# Patient Record
Sex: MALE | Race: WHITE | URBAN - METROPOLITAN AREA
[De-identification: names, ages, dates, MRNs, and addresses within clinical notes are randomized per-mention and may not be internally consistent; named-entity substitution may affect disease eponyms.]

---

## 2019-07-15 ENCOUNTER — HOSPITAL ENCOUNTER (OUTPATIENT)
Dept: LAB | Facility: HOSPITAL | Age: 24
Discharge: HOME | End: 2019-07-15
Attending: INTERNAL MEDICINE

## 2019-07-15 LAB
T4 FREE SERPL-MCNC: 1.3 NG/DL (ref 0.9–1.8)
TSH SERPL-ACNC: 4.35 M[IU]/L (ref 0.27–4.2)

## 2019-07-16 ENCOUNTER — OFFICE VISIT CONVERTED (OUTPATIENT)
Dept: OTOLARYNGOLOGY | Facility: CLINIC | Age: 24
End: 2019-07-16
Attending: OTOLARYNGOLOGY

## 2021-05-15 VITALS
TEMPERATURE: 98.2 F | WEIGHT: 268.25 LBS | OXYGEN SATURATION: 97 % | RESPIRATION RATE: 20 BRPM | HEIGHT: 75 IN | BODY MASS INDEX: 33.35 KG/M2 | HEART RATE: 103 BPM | SYSTOLIC BLOOD PRESSURE: 112 MMHG | DIASTOLIC BLOOD PRESSURE: 64 MMHG

## 2024-09-04 ENCOUNTER — OFFICE VISIT (OUTPATIENT)
Dept: NEUROLOGY | Facility: CLINIC | Age: 29
End: 2024-09-04
Payer: MEDICARE

## 2024-09-04 VITALS
HEIGHT: 75 IN | SYSTOLIC BLOOD PRESSURE: 124 MMHG | HEART RATE: 80 BPM | BODY MASS INDEX: 37.85 KG/M2 | WEIGHT: 304.4 LBS | DIASTOLIC BLOOD PRESSURE: 82 MMHG

## 2024-09-04 DIAGNOSIS — Z87.898 HISTORY OF BRAIN TUMOR: ICD-10-CM

## 2024-09-04 DIAGNOSIS — G43.019 INTRACTABLE MIGRAINE WITHOUT AURA AND WITHOUT STATUS MIGRAINOSUS: Primary | ICD-10-CM

## 2024-09-04 DIAGNOSIS — R42 DIZZINESS: ICD-10-CM

## 2024-09-04 DIAGNOSIS — R26.89 IMBALANCE: ICD-10-CM

## 2024-09-04 RX ORDER — METOPROLOL SUCCINATE 50 MG/1
1 TABLET, EXTENDED RELEASE ORAL EVERY 12 HOURS SCHEDULED
COMMUNITY
Start: 2024-06-06

## 2024-09-04 RX ORDER — ARIPIPRAZOLE 400 MG
400 KIT INTRAMUSCULAR
COMMUNITY
Start: 2024-08-29

## 2024-09-04 RX ORDER — OLANZAPINE 10 MG/1
10 TABLET ORAL NIGHTLY
COMMUNITY
Start: 2024-09-03

## 2024-09-04 RX ORDER — OXCARBAZEPINE 300 MG/1
300 TABLET, FILM COATED ORAL 2 TIMES DAILY
COMMUNITY
Start: 2024-09-03

## 2024-09-04 RX ORDER — RIMEGEPANT SULFATE 75 MG/75MG
75 TABLET, ORALLY DISINTEGRATING ORAL DAILY PRN
Qty: 8 TABLET | Refills: 5 | Status: SHIPPED | OUTPATIENT
Start: 2024-09-04

## 2024-09-04 RX ORDER — PRAZOSIN HYDROCHLORIDE 2 MG/1
6 CAPSULE ORAL NIGHTLY
COMMUNITY
Start: 2024-09-03

## 2024-09-04 RX ORDER — GALCANEZUMAB 120 MG/ML
120 INJECTION, SOLUTION SUBCUTANEOUS
Qty: 1 EACH | Refills: 5 | Status: SHIPPED | OUTPATIENT
Start: 2024-09-04

## 2024-09-04 RX ORDER — BUPROPION HYDROCHLORIDE 300 MG/1
300 TABLET ORAL EVERY MORNING
COMMUNITY

## 2024-09-04 RX ORDER — ACETAMINOPHEN 500 MG
500 TABLET ORAL AS NEEDED
COMMUNITY

## 2024-09-04 RX ORDER — CETIRIZINE HYDROCHLORIDE 10 MG/1
10 TABLET ORAL DAILY
COMMUNITY

## 2024-09-04 RX ORDER — PHENOL 1.4 %
10 AEROSOL, SPRAY (ML) MUCOUS MEMBRANE NIGHTLY
COMMUNITY

## 2024-09-04 NOTE — PROGRESS NOTES
Chief Complaint  Neurologic Problem    Subjective          Bruce Mckeon presents to [facility] NEUROLOGY & NEUROSURGERY  History of Present Illness    History of Present Illness  The patient presents for evaluation of headaches. He is accompanied by his mother.    He has been experiencing worsening headaches since January 2024, with an increase in frequency to about 20 days per month. The intensity of these headaches varies, sometimes lasting for an hour, other times persisting throughout the day. He also reports sensitivity to sunlight, which exacerbates his headaches, necessitating the use of sensitive glasses. His current medications include amitriptyline, metoprolol, and oxcarbazepine, none of which were prescribed specifically for headache prevention. He often resorts to Tylenol, up to twice a day, to manage his headaches. He identifies stress, poor sleep, and weather changes as potential triggers for his headaches, which are primarily located behind his eyes and occasionally at the back of his head.    He also experiences frequent dizzy spells, causing him to stumble and lose balance. These episodes can occur even when he is sitting or walking across a room, forcing him to grab onto walls for support. He describes the sensation as similar to being hit with a baseball, but without the accompanying headache. Instead, he experiences dizziness and fuzzy vision.    His mother recalls that he was diagnosed with a lesion at the age of 10, which was expected to grow slowly and cause progressive symptoms. However, they were not informed about the specific symptoms to anticipate. She expresses concern that the lesion may have grown significantly, leading to his current symptoms. He has not undergone any surgical procedures to remove the lesion due to the associated risks, including potential paralysis and speech loss. Additionally, chemotherapy or radiation therapy to shrink the lesion were not  "considered due to his medication regimen.     Prior prophylactic migraine medication: metoprolol, amitrtiptyline, topiramate/depakote contraindicated d/t mood disorder   Prior abortive migraine medications: Triptans contraindicated d/t risk for serotonin syndrome    Objective   Vital Signs:   /82   Pulse 80   Ht 190.5 cm (75\")   Wt (!) 138 kg (304 lb 6.4 oz)   BMI 38.05 kg/m²     Physical Exam  HENT:      Head: Normocephalic.   Pulmonary:      Effort: Pulmonary effort is normal.   Neurological:      Mental Status: He is alert and oriented to person, place, and time.      Sensory: Sensation is intact.      Motor: Motor function is intact.      Coordination: Coordination is intact.      Deep Tendon Reflexes: Reflexes are normal and symmetric.        Neurologic Exam     Mental Status   Oriented to person, place, and time.        Result Review :             MRI Brain 1/2015:  Multiplanar imaging of the brain with and without contrast is compared to the prior study from December 2013.   There is redemonstration of a small focus of T2 prolongation in the right frontal white matter, just lateral to the right frontal horn. A second focus of T2 prolongation is visualized in the medial left parietal white matter. There is no associated mass   effect, restricted diffusion or enhancement. These are unchanged compared to the prior study and are most consistent with small areas of chronic white matter injury from a previous vascular, inflammatory or metabolic insult . The previously noted T2   prolongation in the left basal ganglia is now resolved. No new lesions are identified. The ventricles are normal in size. There is no cytotoxic edema on the diffusion sequence. The flow voids at the base of the skull appear normal. The corpus callosum is    normally formed. No abnormal enhancement is identified.     Assessment and Plan    Diagnoses and all orders for this visit:    1. Intractable migraine without aura and without " status migrainosus (Primary)  -     MRI Brain With & Without Contrast; Future  -     galcanezumab-gnlm (EMGALITY) auto-injector pen 240 mg    2. History of brain tumor  -     MRI Brain With & Without Contrast; Future    3. Dizziness  -     MRI Brain With & Without Contrast; Future    4. Imbalance  -     MRI Brain With & Without Contrast; Future    Other orders  -     galcanezumab-gnlm (Emgality) 120 MG/ML auto-injector pen; Inject 1 mL under the skin into the appropriate area as directed Every 30 (Thirty) Days.  Dispense: 1 each; Refill: 5  -     Rimegepant Sulfate (Nurtec) 75 MG tablet dispersible tablet; Take 1 tablet by mouth Daily As Needed (migraine).  Dispense: 8 tablet; Refill: 5        Assessment & Plan  1. Headaches.  He has been experiencing worsening headaches over the past year, now occurring about 20 days per month. The headaches are associated with light sensitivity, particularly to sunlight. The frequent use of Tylenol could be exacerbating his headaches. He was advised to limit Tylenol intake to no more than two doses per week. Emgality injections will be initiated monthly for headache prevention. Nurtec, one tablet per day as needed, will be provided for symptomatic relief. The first dose of the injectable will be administered in the office today with a sample.    2. Dizziness.  He reports frequent dizziness, sometimes severe enough to cause stumbling and needing to catch himself. The dizziness could be related to his current medications, which may cause imbalance and ataxia, or it could be associated with his persistent headaches. A brain MRI will be scheduled to obtain recent imaging and assess for any changes in the brain mass that might be contributing to these symptoms.    Follow-up  A follow-up visit is scheduled in 3 months to evaluate his progress and review the imaging results.         Follow Up   Return in about 3 months (around 12/4/2024) for Migraine f/u.  Patient was given instructions  and counseling regarding his condition or for health maintenance advice. Please see specific information pulled into the AVS if appropriate.       Patient or patient representative verbalized consent for the use of Ambient Listening during the visit with  JONATHAN Oconnor for chart documentation. 9/5/2024  11:43 EDT

## 2024-09-05 ENCOUNTER — PRIOR AUTHORIZATION (OUTPATIENT)
Dept: NEUROLOGY | Facility: CLINIC | Age: 29
End: 2024-09-05
Payer: MEDICARE

## 2024-09-05 NOTE — TELEPHONE ENCOUNTER
PA submitted through [facility]  Your request has been approved. Authorization Expiration Date: December 31, 2024.

## 2024-09-19 ENCOUNTER — HOSPITAL ENCOUNTER (OUTPATIENT)
Dept: MRI IMAGING | Facility: HOSPITAL | Age: 29
Discharge: HOME | End: 2024-09-19
Payer: MEDICARE

## 2024-09-19 ENCOUNTER — HOSPITAL ENCOUNTER (OUTPATIENT)
Dept: OTHER | Facility: HOSPITAL | Age: 29
Discharge: HOME | End: 2024-09-19
Payer: MEDICARE

## 2024-09-19 DIAGNOSIS — G43.019 INTRACTABLE MIGRAINE WITHOUT AURA AND WITHOUT STATUS MIGRAINOSUS: ICD-10-CM

## 2024-09-19 DIAGNOSIS — R26.89 IMBALANCE: ICD-10-CM

## 2024-09-19 DIAGNOSIS — Z87.898 HISTORY OF BRAIN TUMOR: ICD-10-CM

## 2024-09-19 DIAGNOSIS — R42 DIZZINESS: ICD-10-CM

## 2024-09-19 PROCEDURE — 70553 MRI BRAIN STEM W/O & W/DYE: CPT

## 2024-09-19 PROCEDURE — 0 GADOBENATE DIMEGLUMINE 529 MG/ML SOLUTION: Performed by: NURSE PRACTITIONER

## 2024-09-19 PROCEDURE — A9577 INJ MULTIHANCE: HCPCS | Performed by: NURSE PRACTITIONER

## 2024-09-19 RX ADMIN — GADOBENATE DIMEGLUMINE 20 ML: 529 INJECTION, SOLUTION INTRAVENOUS at 10:35

## 2024-12-04 ENCOUNTER — OFFICE VISIT (OUTPATIENT)
Dept: NEUROLOGY | Facility: CLINIC | Age: 29
End: 2024-12-04
Payer: MEDICARE

## 2024-12-04 ENCOUNTER — PATIENT MESSAGE (OUTPATIENT)
Dept: NEUROLOGY | Facility: CLINIC | Age: 29
End: 2024-12-04

## 2024-12-04 VITALS
WEIGHT: 315 LBS | BODY MASS INDEX: 39.17 KG/M2 | DIASTOLIC BLOOD PRESSURE: 78 MMHG | SYSTOLIC BLOOD PRESSURE: 124 MMHG | HEART RATE: 88 BPM | HEIGHT: 75 IN

## 2024-12-04 DIAGNOSIS — G43.719 INTRACTABLE CHRONIC MIGRAINE WITHOUT AURA AND WITHOUT STATUS MIGRAINOSUS: Primary | ICD-10-CM

## 2024-12-04 DIAGNOSIS — Q85.00 NEUROFIBROMATOSIS: ICD-10-CM

## 2024-12-04 RX ORDER — RIMEGEPANT SULFATE 75 MG/75MG
75 TABLET, ORALLY DISINTEGRATING ORAL DAILY PRN
Qty: 8 TABLET | Refills: 5 | Status: SHIPPED | OUTPATIENT
Start: 2024-12-04

## 2024-12-04 RX ORDER — ATOGEPANT 60 MG/1
60 TABLET ORAL DAILY
Qty: 30 TABLET | Refills: 5 | Status: SHIPPED | OUTPATIENT
Start: 2024-12-04

## 2024-12-04 NOTE — PROGRESS NOTES
"Chief Complaint  Migraine    Subjective          Bruce Mckeon presents to [facility] NEUROLOGY & NEUROSURGERY  History of Present Illness    History of Present Illness  The patient is a 29-year-old male who presents to the office for a follow-up for migraines. He is accompanied by an adult female.    He reports that his condition initially improved after starting the migraine medication. However, he began experiencing dizzy spells and headaches again. He estimates experiencing headaches on approximately 12 days per month. He notes that his dizziness seems to worsen when the effects of Emgality start to wear off. He finds that Nurtec, which he takes as needed, provides relief.    He also expresses concern about a polyp or similar growth in his sinus.    Additionally, he has a history of neurofibromatosis. A repeat MRI showed stable lesions with no significant changes.       Objective   Vital Signs:   /78   Pulse 88   Ht 190.5 cm (75\")   Wt (!) 144 kg (316 lb 9.6 oz)   BMI 39.57 kg/m²     Physical Exam  HENT:      Head: Normocephalic.   Pulmonary:      Effort: Pulmonary effort is normal.   Neurological:      Mental Status: He is alert and oriented to person, place, and time.      Sensory: Sensation is intact.      Motor: Motor function is intact.      Coordination: Coordination is intact.      Deep Tendon Reflexes: Reflexes are normal and symmetric.        Neurological Exam  Mental Status  Alert. Oriented to person, place, and time.    Sensory  Normal sensation.    Reflexes  Deep tendon reflexes are 2+ and symmetric in all four extremities.    Coordination    Finger-to-nose, rapid alternating movements and heel-to-shin normal bilaterally without dysmetria.      Result Review :               Results for orders placed or performed during the hospital encounter of 09/19/24   MRI Brain With & Without Contrast    Narrative    MRI BRAIN W WO CONTRAST    Date of Exam: 9/19/2024 10:05 AM " EDT    Indication: history of brain mass, headaches. History of neurofibromatosis     Comparison: 1/16/2015      Technique:  Routine multiplanar/multisequence sequence images of the brain were obtained before and after the uneventful administration of Multihance.      Findings:     There are small areas of abnormal increased T2 signal within the periventricular white matter of the right frontal lobe and left occipital lobe. These likely represent NF bodies given the patient's clinical history. No discrete mass lesion identified.   No midline shift or hydrocephalus. No abnormal extra-axial fluid collections.    Major intracranial vascular flow voids are preserved.    Sella and suprasellar cistern appear within normal limits. Craniovertebral junction appears normal.    Postcontrast images demonstrate no pathologic intracranial contrast enhancement. Dural venous sinuses are patent.    Globes and orbits are within normal limits. There is a polyp or mucous retention cyst in the left maxillary sinus. Other visualized paranasal sinuses and mastoid air cells are clear.         Impression    Impression:    1. Areas of abnormal increased T2 signal within the periventricular white matter of both cerebral hemispheres which may be related to NF bodies given the patient's history of neurofibromatosis.  2. No intracranial mass lesion identified.  3. No pathologic contrast enhancement or other acute abnormality.        Electronically Signed: Sloan Lozoya MD    9/19/2024 1:34 PM EDT    Workstation ID: [id number]       Assessment and Plan    Diagnoses and all orders for this visit:    1. Intractable chronic migraine without aura and without status migrainosus (Primary)    2. Neurofibromatosis    Other orders  -     Atogepant (Qulipta) 60 MG tablet; Take 1 tablet by mouth Daily.  Dispense: 30 tablet; Refill: 5  -     Rimegepant Sulfate (Nurtec) 75 MG tablet dispersible tablet; Take 1 tablet by mouth Daily As Needed (migraine).   Dispense: 8 tablet; Refill: 5        Assessment & Plan  1. Migraine.  His symptoms are primarily attributed to migraines. The MRI findings are consistent with neurofibromatosis, with no significant changes or areas of concern. A mucus retention cyst is present in the sinuses, which is not a neurological finding. The plan is to discontinue Emgality injections and initiate a daily regimen of Qulipta, a preventative headache medication. He is advised to continue taking Nurtec as needed. Once insurance approval is obtained, he can commence Qulipta, even if he is not receiving Emgality injections. The dosage is one tablet daily. Should he encounter any issues with Qulipta, he is to inform me immediately.    Follow-up  A follow-up appointment is scheduled in 4 months.         Follow Up   Return in about 4 months (around 4/4/2025) for Migraine f/u.  Patient was given instructions and counseling regarding his condition or for health maintenance advice. Please see specific information pulled into the AVS if appropriate.       Patient or patient representative verbalized consent for the use of Ambient Listening during the visit with  JONATHAN Oconnor for chart documentation. 12/9/2024  08:29 EST

## 2024-12-05 ENCOUNTER — PRIOR AUTHORIZATION (OUTPATIENT)
Dept: NEUROLOGY | Facility: CLINIC | Age: 29
End: 2024-12-05
Payer: MEDICARE

## 2024-12-05 NOTE — TELEPHONE ENCOUNTER
PA for Qulipta submitted through [facility]  This drug/product is not covered under the pharmacy benefit. Prior Authorization is not available

## 2024-12-05 NOTE — TELEPHONE ENCOUNTER
PA for Nurtec submitted through [facility]  This drug/product is not covered under the pharmacy benefit. Prior Authorization is not available

## 2024-12-09 NOTE — TELEPHONE ENCOUNTER
Resubmitted through [employer]  The patient currently has access to the requested medication and a Prior Authorization is not needed for the patient/medication.

## 2024-12-09 NOTE — TELEPHONE ENCOUNTER
Resubmitted through [employer]  Approved today by [employer] N[employer] 2017  Your request has been approved  Authorization Expiration Date: 3/9/2025

## 2025-01-29 RX ORDER — RIMEGEPANT SULFATE 75 MG/75MG
TABLET, ORALLY DISINTEGRATING ORAL
Qty: 8 TABLET | Refills: 5 | OUTPATIENT
Start: 2025-01-29

## 2025-03-10 ENCOUNTER — PRIOR AUTHORIZATION (OUTPATIENT)
Dept: NEUROLOGY | Facility: CLINIC | Age: 30
End: 2025-03-10
Payer: MEDICARE

## 2025-03-10 NOTE — TELEPHONE ENCOUNTER
Your request has been approved  Effective Date: 1/1/2025  Authorization Expiration Date: 12/31/2025

## 2025-04-07 ENCOUNTER — TELEPHONE (OUTPATIENT)
Dept: NEUROLOGY | Facility: CLINIC | Age: 30
End: 2025-04-07
Payer: MEDICARE

## 2025-04-07 NOTE — TELEPHONE ENCOUNTER
Caller: DIMPLE EISENBERG    Relationship to patient: Emergency Contact    Best call back number: [phone]    Chief complaint: BRAIN TUMORS    Type of visit: FOLLOW UP     Requested date: ASAP    If rescheduling, when is the original appointment: 4/9/25    Additional notes: THEY CAN'T COME THIS WEEK BECAUSE THE ROADS ARE CLOSED WITH FLOODING BUT CANNOT WAIT UNTIL JULY FOR NEXT AVAILABLE. PLEASE HELP RESCHEDULE SOONER.    ”

## 2025-04-30 RX ORDER — ATOGEPANT 60 MG/1
60 TABLET ORAL DAILY
Qty: 30 TABLET | Refills: 0 | Status: SHIPPED | OUTPATIENT
Start: 2025-04-30

## 2025-06-13 NOTE — PROGRESS NOTES
Patient Name: Bruce Mckeon   Visit Date: 06/17/2025   Patient ID: [id number]  Provider: JONATHAN Anderson    Sex: male  Location: [facility]   YOB: 1995  Location Address: [address] [place] [address], [address] [place] [address],?[place]?[id number]    Primary Care Provider Chelsy Tobar APRN  Location Phone: [phone]    Referring Provider: No ref. provider found        Chief Complaint  Nose Bleed (Frequent nose bleeds, last time had to have this cauterized  ), Establish Care, and Dizziness (Having freuqent dizzy spells that throws patients balance off)    History of Present Illness  Bruce Mckeon is a 30 y.o. male who presents to [facility] for Nose Bleed (Frequent nose bleeds, last time had to have this cauterized  ), Establish Care, and Dizziness (Having freuqent dizzy spells that throws patients balance off)  Patient referred to ENT on 2/21/2025 by JONATHAN Vela for epistaxis evaluation.  History of allergies on Zyrtec.  Previous cauterization for epistaxis in the past.  MRI with/ without contrast 9/4/24: Impression:  1. Areas of abnormal increased T2 signal within the periventricular white matter of both cerebral hemispheres which may be related to NF bodies given the patient's history of neurofibromatosis.  2. No intracranial mass lesion identified.  3. No pathologic contrast enhancement or other acute abnormality.  -There is a polyp or mucous retention cyst in the left maxillary sinus. Other visualized paranasal sinuses and mastoid air cells are clear.   -Personal examination of the MRI showed bilateral inferior turbinate hypertrophy with bilateral middle turbinate jesse bullosa.  Septum is midline.  No evidence of acoustic neuroma on evaluation of IACs.  History of Present Illness  The patient is a 30-year-old male who presents for evaluation of nosebleeds, dizziness, and sinus congestion.  He is accompanied by his mother.    He has been  experiencing nosebleeds or significant over the winter months. The nosebleeds occur on either nostril side, regardless of his position, and can last from 4 to 10 minutes. He manages them by elevating his head, inserting a tissue into his nose, and closing his eyes. Occasionally, he uses ice packs. He reports that his bleeding time was longer when he was younger. He has a history of nosebleeds, which were previously managed with cauterization. Over the past year, he has experienced nosebleeds at least once a week, with a noted increase during the winter months. The frequency of these episodes has recently decreased, with the last episode occurring approximately a month ago.    He experiences dizziness when stretching his body, describing it as a sensation of pressure on the brain, akin to being on a boat or having the room spin around him. He also reports lightheadedness. He does not experience hearing loss, ear fullness, drainage, or pain. 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 Initially, the dizziness was attributed to these conditions. However, the onset of nosebleeds led to further investigation. He is under the care of a neurologist who initially suspected a link between his migraines, dizziness, and the tumors.    He has a history of sinus issues, which have persisted since colin COVID-19 twice. He reports chronic congestion and difficulty breathing but does not use any nasal sprays. He has a history of ear infections, which were managed with ear tubes, adenoidectomy, and tonsillectomy.     He has schizophrenia and experiences auditory and visual hallucinations.    PAST SURGICAL HISTORY:  Ear tubes, adenoidectomy, and tonsillectomy at age 4.    Audiogram from 6/17/2025: SRT 15 on the right and 10 on the left.  Word discrimination scores 100% bilaterally.  Type a tympanograms bilaterally.  Pure-tone show right mild high-frequency SNHL at  "4000 Hz.  Left mild SNHL at 4008 1000 Hz.    Vital Signs:  Vitals:    06/17/25 1004   BP: 114/78   BP Location: Right arm   Patient Position: Sitting   Cuff Size: Large Adult   Pulse: 87   Temp: 98.2 °F (36.8 °C)   TempSrc: Temporal   SpO2: 97%   Weight: (!) 143 kg (315 lb)   Height: 190.5 cm (75\")        Past Medical History:   Diagnosis Date    Asthma     Brain lesion     Cluster headache     Dental disease     Developmental delay     Dizziness     Memory loss     Migraine     Neurofibromatosis     type 1    Nosebleed     Schizophrenia     Seizures     detected on EEG       Past Surgical History:   Procedure Laterality Date    ADENOIDECTOMY      EAR TUBES      x2    KNEE ARTHROSCOPY W/ ACL RECONSTRUCTION Right     TONSILLECTOMY AND ADENOIDECTOMY           Current Outpatient Medications:     Abilify Maintena 400 MG prefilled syringe IM prefilled syringe, Inject 400 mg into the appropriate muscle as directed by prescriber Every 30 (Thirty) Days. Invega injection per pt, Disp: , Rfl:     acetaminophen (TYLENOL) 500 MG tablet, Take 1 tablet by mouth As Needed for Mild Pain., Disp: , Rfl:     amitriptyline (ELAVIL) 25 MG tablet, Take 1 tablet by mouth Every Night., Disp: , Rfl:     Atogepant (Qulipta) 60 MG tablet, TAKE 1 TABLET BY MOUTH DAILY., Disp: 30 tablet, Rfl: 0    buPROPion XL (WELLBUTRIN XL) 300 MG 24 hr tablet, Take 1 tablet by mouth Every Morning., Disp: , Rfl:     cetirizine (zyrTEC) 10 MG tablet, Take 1 tablet by mouth Daily., Disp: , Rfl:     Melatonin 10 MG tablet, Take 1 tablet by mouth Every Night., Disp: , Rfl:     metoprolol succinate XL (TOPROL-XL) 50 MG 24 hr tablet, Take 1 tablet by mouth Every 12 (Twelve) Hours. (Patient taking differently: Take 1 tablet by mouth Daily.), Disp: , Rfl:     OLANZapine (zyPREXA) 10 MG tablet, Take 1 tablet by mouth Every Night., Disp: , Rfl:     OXcarbazepine (TRILEPTAL) 300 MG tablet, Take 1 tablet by mouth 2 (Two) Times a Day., Disp: , Rfl:     Paliperidone " Palmitate (INVEGA SUSTENNA IM), Inject  into the appropriate muscle as directed by prescriber., Disp: , Rfl:     prazosin (MINIPRESS) 2 MG capsule, Take 3 capsules by mouth Every Night., Disp: , Rfl:     Rimegepant Sulfate (Nurtec) 75 MG tablet dispersible tablet, Take 1 tablet by mouth Daily As Needed (migraine)., Disp: 8 tablet, Rfl: 5    fluticasone (FLONASE) 50 MCG/ACT nasal spray, Administer 2 sprays into the nostril(s) as directed by provider Daily. Administer 2 sprays in each nostril for each dose., Disp: 16 g, Rfl: 6    galcanezumab-gnlm (Emgality) 120 MG/ML auto-injector pen, Inject 1 mL under the skin into the appropriate area as directed Every 30 (Thirty) Days., Disp: 1 each, Rfl: 5     No Known Allergies    Social History     Tobacco Use    Smoking status: Never    Smokeless tobacco: Never   Vaping Use    Vaping status: Never Used   Substance Use Topics    Alcohol use: Never     Comment: occ    Drug use: Never        Objective     Tobacco Use: Low Risk  (6/17/2025)    Patient History     Smoking Tobacco Use: Never     Smokeless Tobacco Use: Never     Passive Exposure: Not on file         Physical Exam    Constitutional   Appearance  well developed, well-nourished, alert and in no acute distress, voice clear and strong    Head   Inspection  no deformities or lesions, atraumatic    Face   Inspection  no facial lesions; House-Brackmann I/VI bilaterally   Palpation  no TMJ crepitus nor  muscle tenderness bilaterally     Eyes/Vision   Visual Fields  extraocular movements are intact, no spontaneous or gaze-induced nystagmus  Conjunctivae  clear   Sclerae  clear   Pupils and Irises  pupils equal, round, and reactive to light.   Nystagmus  not present with Dell-Hallpike or head impulse test    Ears, Nose, Mouth and Throat  Ears  External Ears  Auricles appearance within normal limits, no lesions present   Otoscopic Examination  tympanic membrane appearance within normal limits bilaterally without  perforations, well-aerated middle ears without evidence of effusion  Hearing  intact to conversational voice both ears   Tunning fork testing    Rinne:  Galindo:    Nose  External Nose  appearance normal   Intranasal Exam  mucosa diffuse erythema with bilateral inferior turbinate hypertrophy, vestibules normal, no intranasal lesions present, septum midline with no vascular lesions, sinuses non tender to percussion   Modified Walton Test:    Oral Cavity  Oral Mucosa  oral mucosa normal without pallor or cyanosis   Stensen's and Warthin's ducts are productive and patent with clear saliva  Lips  lip appearance normal   Teeth  normal dentition for age   Gums  gums pink, non-swollen, no bleeding present   Tongue  tongue appearance normal; normal mobility   Palate  hard palate normal, soft palate appearance normal with symmetric mobility     Throat  Oropharynx  Posterior cobblestoning with clear PND   tonsils  Bilateral tonsils unremarkable  Hypopharynx  appearance within normal limits   Larynx  voice normal     Neck  Inspection/Palpation  normal appearance, no masses or tenderness, trachea midline; thyroid size normal, nontender, no nodules or masses present on palpation     Lymphatic  Neck  no lymphadenopathy present   Supraclavicular Nodes  no lymphadenopathy present   Preauricular Nodes  no lymphadenopathy present     Respiratory  Respiratory Effort  breathing unlabored   Inspection of Chest  normal appearance, no retractions     Musculoskeletal   Cervical back: Normal range of motion and neck supple.      Skin and Subcutaneous Tissue  General Inspection  Regarding face and neck - there are no rashes present, no lesions present, and no areas of discoloration     Neurologic  Cranial Nerves  Alert and oriented x3  cranial nerves II-XII are grossly intact bilaterally   Gait and Station  normal gait, able to stand without diffculty    Psychiatric  Judgement and Insight  judgment and insight intact   Mood and Affect  mood  "normal, affect appropriate       RESULTS REVIEWED    I have reviewed the following information:   []  Previous Internal Note  [x]  Previous External Note:   [x]  Ordered Tests & Results:      Pathology: No results found for: \"MICRO\"    TSH   Date Value Ref Range Status   07/15/2019 4.350 (H) 0.270 - 4.200 m[iU]/L Final     Free T4   Date Value Ref Range Status   07/15/2019 1.3 0.9 - 1.8 ng/dL Final       No Images in the past 120 days found..    Results  Imaging   - MRI: Right maxillary sinus completely open, left maxillary sinus looks good except for a small polyp, likely a mucous retention cyst. Septum is straight. Turbinates are mildly enlarged creating a little bit of lack of airflow. Small air pockets in middle turbinates on both sides, called jesse bullosa. Frontal sinuses look good, sphenoid sinuses back behind eyes are clear. No signs of infection or blockage in ethmoid and sphenoid sinuses.    Diagnostic Testing   - Tympanogram: No fluid behind ears.      I have discussed the interpretation of the above results with the patient.    Procedures          Assessment and Plan   Diagnoses and all orders for this visit:    1. Recurrent epistaxis (Primary)  -     Von Willebrand Panel; Future  -     aPTT; Future  -     Protime-INR; Future  -     CBC Auto Differential; Future    2. Seasonal allergic rhinitis due to pollen  -     fluticasone (FLONASE) 50 MCG/ACT nasal spray; Administer 2 sprays into the nostril(s) as directed by provider Daily. Administer 2 sprays in each nostril for each dose.  Dispense: 16 g; Refill: 6    3. Family history of von Willebrand disease  -     Von Willebrand Panel; Future  -     aPTT; Future  -     Protime-INR; Future  -     CBC Auto Differential; Future    4. Dizziness  -     Audiometry With Tympanometry  -     Videonystagmography; Future    5. Nasal turbinate hypertrophy  -     fluticasone (FLONASE) 50 MCG/ACT nasal spray; Administer 2 sprays into the nostril(s) as directed by " provider Daily. Administer 2 sprays in each nostril for each dose.  Dispense: 16 g; Refill: 6    6. Intractable chronic migraine without aura and without status migrainosus  -     Videonystagmography; Future    7. History of neurofibromatosis        Assessment & Plan  1. Epistaxis.  The epistaxis is likely due to allergic inflammation and seasonal dryness, with no identifiable cause on the septum that could be cauterized. The frequency of nosebleeds appears to be seasonal, potentially exacerbated by dry winter conditions. He was advised against tilting his head back during episodes of epistaxis to prevent blood from draining down the throat and into the stomach. Instead, he should lean forward, apply pressure to both sides of the septum for 5 minutes, and use over-the-counter Afrin or Delbert-Synephrine spray if bleeding persists. A humidifier and nasal saline spray were recommended for use during the winter months. Laboratory tests will be ordered to rule out von Willebrand's disease. If the frequency of nosebleeds increases, follow-up appointment will be scheduled to assess the need for cauterization.    2. Dizziness.  The MRI did not reveal any concerning vestibular findings. Mild sensorineural hearing loss was noted in both ears, but there is no evidence of fluid behind the ears based on the tympanogram. Hearing protection was recommended when exposed to loud noises from motorized equipment, gunfire, or fireworks. a VNG will be ordered to rule out any vestibular issues. The hearing test will be repeated in 06/2026.    3. Sinus congestion.  The sinus congestion is likely contributing to his symptoms. Flonase nasal spray was prescribed, with instructions to administer 2 sprays in each nostril daily, and to perform a daily nasal saline rinse. If there is no improvement after 2 months of using Flonase and the nasal saline rinse, a CT scan will be considered to evaluate the need for surgical  intervention.    Follow-up  Follow-up in 2 months.      (R04.0) Recurrent epistaxis - Plan: Von Willebrand Panel, aPTT, Protime-INR, CBC Auto Differential    (J30.1) Seasonal allergic rhinitis due to pollen - Plan: fluticasone (FLONASE) 50 MCG/ACT nasal spray    (Z83.2) Family history of von Willebrand disease - Plan: Von Willebrand Panel, aPTT, Protime-INR, CBC Auto Differential    (R42) Dizziness - Plan: Audiometry With Tympanometry, Videonystagmography    (J34.3) Nasal turbinate hypertrophy - Plan: fluticasone (FLONASE) 50 MCG/ACT nasal spray    (G43.719) Intractable chronic migraine without aura and without status migrainosus - Plan: Videonystagmography    (Q85.00) History of neurofibromatosis     Bruce Mckeon  reports that he has never smoked. He has never used smokeless tobacco.       Plan:  Patient Instructions   Nosebleed Fact Sheet    Nosebleeds are a common problem that we see in our clinic and can occur in any age group.  They can range from spotty bleeding to episodes of uncontrolled profuse bleeding.  Multiple medical conditions can contribute to nosebleeds and fortunately most of these can be controlled to limit and/or eliminate these bleeding episodes.    Most commonly bleeding occurs in the anterior or front part of the nose on the septum, or center wall of the nose.  This is because this area has several blood vessels vulnerable to both the drying effect of breathing, and to finger trauma of nose picking.  When this area become dry, the lining of the nose in this area can crack and cause the blood vessels underneath to bleed.    The following condition can contribute to and cause nosebleeds:  High Blood Pressure - When the blood pressure is high, the increased pressure can cause blood to be more easily pushed through a damaged blood vessel.  If your blood pressure is uncontrolled over 140 systolic, you may need to consult your primary-care physician to help limit your nosebleeds.  Low Platelets  and Blood Clotting Factors - Certain medical conditions such as cancer and bleeding disorders can interfere with the body’s ability to form blood clots.  This interferes with the body’s own ability to stop nosebleeds.  Medications - Aspirin and aspirin type products such as nonsteroidal anti-inflammatory medications can interfere with body’s ability to form blood clots.  Nonsteroidal anti-inflammatory medications include medicines like ibuprofen (Motrin), naprosyn (Aleve), and Goody’s and BC powder.  Several cold and flu remedies also include aspirin.  If there’s a question, please ask you doctor or pharmacist.  Recently, it has been shown that some herbal medications, such as high doses of Vitamin E, can also interfere with the body’s ability to form clots.  Often times, these types of medications need to be discontinued to help with nosebleed prevention.  Dryness - The nose needs to stay nice and moist to try to prevent any kind of cracking or injury to the nasal lining and underlying blood vessels.  The worst time of year for nosebleeds is in the wintertime when the humidity is low.  Occasionally, a nasal deviation can cause abnormal airflow that dries out an area on the septum and cause a nosebleed.  Trauma -One of the most common reasons for nosebleeds is trauma caused by nose picking or external injury.  If an area in your nose is irritated, scratching or picking it can cause it to easily bleed.  Recommendations for Preventing Nosebleeds:  Keep well hydrated by drinking at least six to eight glasses of water a day.  Increase the moisture of the nose by placing Vaseline in the front of the nose twice a day and irrigating the nose with nasal saline spray often (every 1-2 hrs).  Hold on taking aspirin or aspirin type products.  If you have high blood pressure, make sure this is well controlled.  Avoid nose picking and other forms of nasal trauma.  What to Do if Your Nose Bleeds:  Spray Afrin or a similar 12 hr  nasal decongestant into the side that is bleeding.  With your thumb and forefinger, grasp the fleshy part of the nose and hold firm pressure.  If the bleeding is on the front part of the nose, it should make it stop.  Hold this pressure for 5 minutes on the clock then release.  If the nose is still bleeding, repeat.  If the nose is still bleeding after trying this 2-3 times, you may need to go to the emergency room for evaluation.  Call your doctor or go to the emergency room if you cannot get the bleeding to stop or if you feel dizzy or lightheaded after a large bleed.      Nosebleed, Adult  A nosebleed is when blood comes out of the nose. Nosebleeds are common. Usually, they are not a sign of a serious condition.  Nosebleeds can happen if a blood vessel in your nose starts to bleed or if the lining of your nose (mucous membrane) cracks. They are commonly caused by:  Allergies.  Colds.  Picking your nose.  Blowing your nose too hard.  An injury from sticking an object into your nose or getting hit in the nose.  Dry or cold air.  Less common causes of nosebleeds include:  Toxic fumes.  Something abnormal in the nose or in the air-filled spaces in the bones of the face (sinuses).  Growths in the nose, such as polyps.  Blood thinners or conditions that cause blood to clot slowly.  Certain illnesses or procedures that irritate or dry out the nasal passages.  Follow these instructions at home:  When you have a nosebleed:    Sit down and tilt your head slightly forward.  Use a clean towel or tissue to pinch your nostrils under the bony part of your nose. After 5 minutes, let go of your nose and see if bleeding starts again. Do not release pressure before that time. If there is still bleeding, repeat the pinching and holding for 5 minutes or until the bleeding stops.  Do not place tissues or gauze in the nose to stop the bleeding.  Avoid lying down and avoid tilting your head backward. That may make blood collect in the  throat and cause gagging or coughing.  Use a nasal spray decongestant to help with a nosebleed as told by your health care provider.  After a nosebleed:  Avoid blowing your nose or sniffing for a number of hours.  Avoid straining, lifting, or bending at the waist for several days. You may go back to other normal activities as you are able.  If you are taking aspirin or blood thinners and you have nosebleeds, talk to your health care provider. These medicines make bleeding more likely.  Ask your health care provider if you should stop taking the medicines or if you should adjust the dose.  Do not stop taking medicines that your health care provider has recommended unless he or she tells you to stop taking them.  If your nosebleed was caused by dry mucous membranes, use over-the-counter saline nasal spray or gel and a humidifier as told by your health care provider. This will keep the mucous membranes moist and allow them to heal. If you need to use one of these products:  Choose one that is water-soluble.  Use only as much as you need and use it only as often as needed.  Do not lie down right after you use it.  If you get nosebleeds often, talk with your health care provider about medical treatments. Options may include:  Nasal cautery. This treatment stops and prevents nosebleeds by using a chemical swab or electrical device to lightly burn tiny blood vessels inside the nose.  Nasal packing. A gauze or other material is placed in the nose to keep constant pressure on the bleeding area.  Contact a health care provider if you:  Have a fever.  Get nosebleeds often or more often than usual.  Bruise very easily.  Have a nosebleed from having something stuck in your nose.  Have bleeding in your mouth.  Vomit or cough up brown material.  Have a nosebleed after you start a new medicine.  Get help right away if:  You have a nosebleed after a fall or a head injury.  Your nosebleed does not go away after 20 minutes.  You feel  dizzy or weak.  You have unusual bleeding from other parts of your body.  You have unusual bruising on other parts of your body.  You become sweaty.  You vomit blood.  Summary  A nosebleed is when blood comes out of the nose. Common causes include allergies, an injury to the nose, or cold or dry air.  Initial treatment includes applying pressure for 5 minutes.  Moisturizing the nose with saline nasal spray or gel after a nosebleed may help prevent future bleeding.  Get help right away if your nosebleed does not go away after 20 minutes.  This information is not intended to replace advice given to you by your health care provider. Make sure you discuss any questions you have with your health care provider.    Proper Instillation of Intranasal sprays:  Blow nose to clear nostrils.  Place the tip of the nozzle in one nostril and close the other nostril with your finger.  Aim slightly away from the center of your nose, toward the corner of the eye, press the white nozzle, and sniff the mist in gently. Be careful not to spray into your eyes.  Exhale through the mouth.  Repeat process in other nostril.   -The recommended starting dose for Flonase in adults is 2 sprays in each nostril once a day.  -Your provider may have recommended use of nasal saline or nasal navage use to rinse your sinuses. If this is the case, it would be recommended use use saline prior to instillation of intranasal medications to rinse the sinus surface prior to the medication.  -Avoid use of nasal saline or nasal rinses within 30 minutes after intranasal medication use, as to allow the medication to fully absorb.       Follow Up   Return in about 2 months (around 8/17/2025).  Patient was given instructions and counseling regarding his condition or for health maintenance advice. Please see specific information pulled into the AVS if appropriate.      Patient or patient representative verbalized consent for the use of Ambient Listening during the  visit with  JONATHAN Anderson for chart documentation. 6/17/2025  12:03 EDT    All or a portion of this Note was dictated utilizing Dragon Dictation.

## 2025-06-17 ENCOUNTER — OFFICE VISIT (OUTPATIENT)
Dept: OTOLARYNGOLOGY | Facility: CLINIC | Age: 30
End: 2025-06-17
Payer: MEDICARE

## 2025-06-17 ENCOUNTER — PROCEDURE VISIT (OUTPATIENT)
Dept: OTOLARYNGOLOGY | Facility: CLINIC | Age: 30
End: 2025-06-17
Payer: MEDICARE

## 2025-06-17 ENCOUNTER — LAB (OUTPATIENT)
Dept: LAB | Facility: HOSPITAL | Age: 30
End: 2025-06-17
Payer: MEDICARE

## 2025-06-17 VITALS
HEIGHT: 75 IN | HEART RATE: 87 BPM | DIASTOLIC BLOOD PRESSURE: 78 MMHG | WEIGHT: 315 LBS | BODY MASS INDEX: 39.17 KG/M2 | TEMPERATURE: 98.2 F | OXYGEN SATURATION: 97 % | SYSTOLIC BLOOD PRESSURE: 114 MMHG

## 2025-06-17 DIAGNOSIS — Z83.2 FAMILY HISTORY OF VON WILLEBRAND DISEASE: ICD-10-CM

## 2025-06-17 DIAGNOSIS — R42 DIZZINESS: ICD-10-CM

## 2025-06-17 DIAGNOSIS — Z83.2 FAMILY HISTORY OF VON WILLEBRAND DISEASE: Primary | ICD-10-CM

## 2025-06-17 DIAGNOSIS — H90.3 SENSORINEURAL HEARING LOSS, BILATERAL: ICD-10-CM

## 2025-06-17 DIAGNOSIS — J30.1 SEASONAL ALLERGIC RHINITIS DUE TO POLLEN: ICD-10-CM

## 2025-06-17 DIAGNOSIS — Q85.00: ICD-10-CM

## 2025-06-17 DIAGNOSIS — G43.719 INTRACTABLE CHRONIC MIGRAINE WITHOUT AURA AND WITHOUT STATUS MIGRAINOSUS: ICD-10-CM

## 2025-06-17 DIAGNOSIS — R04.0 RECURRENT EPISTAXIS: Primary | ICD-10-CM

## 2025-06-17 DIAGNOSIS — R04.0 RECURRENT EPISTAXIS: ICD-10-CM

## 2025-06-17 DIAGNOSIS — J34.3 NASAL TURBINATE HYPERTROPHY: ICD-10-CM

## 2025-06-17 LAB
APTT PPP: 32.9 SECONDS (ref 24.2–34.2)
BASOPHILS # BLD AUTO: 0.05 10*3/MM3 (ref 0–0.2)
BASOPHILS NFR BLD AUTO: 0.5 % (ref 0–1.5)
DEPRECATED RDW RBC AUTO: 42.8 FL (ref 37–54)
EOSINOPHIL # BLD AUTO: 0.12 10*3/MM3 (ref 0–0.4)
EOSINOPHIL NFR BLD AUTO: 1.3 % (ref 0.3–6.2)
ERYTHROCYTE [DISTWIDTH] IN BLOOD BY AUTOMATED COUNT: 12.9 % (ref 12.3–15.4)
HCT VFR BLD AUTO: 51.9 % (ref 37.5–51)
HGB BLD-MCNC: 16.5 G/DL (ref 13–17.7)
IMM GRANULOCYTES # BLD AUTO: 0.04 10*3/MM3 (ref 0–0.05)
IMM GRANULOCYTES NFR BLD AUTO: 0.4 % (ref 0–0.5)
INR PPP: 1.03 (ref 0.86–1.15)
LYMPHOCYTES # BLD AUTO: 2.46 10*3/MM3 (ref 0.7–3.1)
LYMPHOCYTES NFR BLD AUTO: 25.7 % (ref 19.6–45.3)
MCH RBC QN AUTO: 28.8 PG (ref 26.6–33)
MCHC RBC AUTO-ENTMCNC: 31.8 G/DL (ref 31.5–35.7)
MCV RBC AUTO: 90.6 FL (ref 79–97)
MONOCYTES # BLD AUTO: 0.79 10*3/MM3 (ref 0.1–0.9)
MONOCYTES NFR BLD AUTO: 8.2 % (ref 5–12)
NEUTROPHILS NFR BLD AUTO: 6.13 10*3/MM3 (ref 1.7–7)
NEUTROPHILS NFR BLD AUTO: 63.9 % (ref 42.7–76)
NRBC BLD AUTO-RTO: 0 /100 WBC (ref 0–0.2)
PLATELET # BLD AUTO: 195 10*3/MM3 (ref 140–450)
PMV BLD AUTO: 12 FL (ref 6–12)
PROTHROMBIN TIME: 14 SECONDS (ref 11.8–14.9)
RBC # BLD AUTO: 5.73 10*6/MM3 (ref 4.14–5.8)
WBC NRBC COR # BLD AUTO: 9.59 10*3/MM3 (ref 3.4–10.8)

## 2025-06-17 PROCEDURE — 85246 CLOT FACTOR VIII VW ANTIGEN: CPT

## 2025-06-17 PROCEDURE — 92557 COMPREHENSIVE HEARING TEST: CPT | Performed by: AUDIOLOGIST

## 2025-06-17 PROCEDURE — 92567 TYMPANOMETRY: CPT | Performed by: AUDIOLOGIST

## 2025-06-17 PROCEDURE — 1159F MED LIST DOCD IN RCRD: CPT

## 2025-06-17 PROCEDURE — 85610 PROTHROMBIN TIME: CPT

## 2025-06-17 PROCEDURE — 85245 CLOT FACTOR VIII VW RISTOCTN: CPT

## 2025-06-17 PROCEDURE — 85240 CLOT FACTOR VIII AHG 1 STAGE: CPT

## 2025-06-17 PROCEDURE — 36415 COLL VENOUS BLD VENIPUNCTURE: CPT

## 2025-06-17 PROCEDURE — 85730 THROMBOPLASTIN TIME PARTIAL: CPT

## 2025-06-17 PROCEDURE — 1160F RVW MEDS BY RX/DR IN RCRD: CPT

## 2025-06-17 PROCEDURE — 99214 OFFICE O/P EST MOD 30 MIN: CPT

## 2025-06-17 PROCEDURE — 85025 COMPLETE CBC W/AUTO DIFF WBC: CPT

## 2025-06-17 RX ORDER — FLUTICASONE PROPIONATE 50 MCG
2 SPRAY, SUSPENSION (ML) NASAL DAILY
Qty: 16 G | Refills: 6 | Status: SHIPPED | OUTPATIENT
Start: 2025-06-17

## 2025-06-17 NOTE — PATIENT INSTRUCTIONS
Nosebleed Fact Sheet    Nosebleeds are a common problem that we see in our clinic and can occur in any age group.  They can range from spotty bleeding to episodes of uncontrolled profuse bleeding.  Multiple medical conditions can contribute to nosebleeds and fortunately most of these can be controlled to limit and/or eliminate these bleeding episodes.    Most commonly bleeding occurs in the anterior or front part of the nose on the septum, or center wall of the nose.  This is because this area has several blood vessels vulnerable to both the drying effect of breathing, and to finger trauma of nose picking.  When this area become dry, the lining of the nose in this area can crack and cause the blood vessels underneath to bleed.    The following condition can contribute to and cause nosebleeds:  High Blood Pressure - When the blood pressure is high, the increased pressure can cause blood to be more easily pushed through a damaged blood vessel.  If your blood pressure is uncontrolled over 140 systolic, you may need to consult your primary-care physician to help limit your nosebleeds.  Low Platelets and Blood Clotting Factors - Certain medical conditions such as cancer and bleeding disorders can interfere with the body’s ability to form blood clots.  This interferes with the body’s own ability to stop nosebleeds.  Medications - Aspirin and aspirin type products such as nonsteroidal anti-inflammatory medications can interfere with body’s ability to form blood clots.  Nonsteroidal anti-inflammatory medications include medicines like ibuprofen (Motrin), naprosyn (Aleve), and Goody’s and BC powder.  Several cold and flu remedies also include aspirin.  If there’s a question, please ask you doctor or pharmacist.  Recently, it has been shown that some herbal medications, such as high doses of Vitamin E, can also interfere with the body’s ability to form clots.  Often times, these types of medications need to be discontinued to  help with nosebleed prevention.  Dryness - The nose needs to stay nice and moist to try to prevent any kind of cracking or injury to the nasal lining and underlying blood vessels.  The worst time of year for nosebleeds is in the wintertime when the humidity is low.  Occasionally, a nasal deviation can cause abnormal airflow that dries out an area on the septum and cause a nosebleed.  Trauma -One of the most common reasons for nosebleeds is trauma caused by nose picking or external injury.  If an area in your nose is irritated, scratching or picking it can cause it to easily bleed.  Recommendations for Preventing Nosebleeds:  Keep well hydrated by drinking at least six to eight glasses of water a day.  Increase the moisture of the nose by placing Vaseline in the front of the nose twice a day and irrigating the nose with nasal saline spray often (every 1-2 hrs).  Hold on taking aspirin or aspirin type products.  If you have high blood pressure, make sure this is well controlled.  Avoid nose picking and other forms of nasal trauma.  What to Do if Your Nose Bleeds:  Spray Afrin or a similar 12 hr nasal decongestant into the side that is bleeding.  With your thumb and forefinger, grasp the fleshy part of the nose and hold firm pressure.  If the bleeding is on the front part of the nose, it should make it stop.  Hold this pressure for 5 minutes on the clock then release.  If the nose is still bleeding, repeat.  If the nose is still bleeding after trying this 2-3 times, you may need to go to the emergency room for evaluation.  Call your doctor or go to the emergency room if you cannot get the bleeding to stop or if you feel dizzy or lightheaded after a large bleed.      Nosebleed, Adult  A nosebleed is when blood comes out of the nose. Nosebleeds are common. Usually, they are not a sign of a serious condition.  Nosebleeds can happen if a blood vessel in your nose starts to bleed or if the lining of your nose (mucous  membrane) cracks. They are commonly caused by:  Allergies.  Colds.  Picking your nose.  Blowing your nose too hard.  An injury from sticking an object into your nose or getting hit in the nose.  Dry or cold air.  Less common causes of nosebleeds include:  Toxic fumes.  Something abnormal in the nose or in the air-filled spaces in the bones of the face (sinuses).  Growths in the nose, such as polyps.  Blood thinners or conditions that cause blood to clot slowly.  Certain illnesses or procedures that irritate or dry out the nasal passages.  Follow these instructions at home:  When you have a nosebleed:    Sit down and tilt your head slightly forward.  Use a clean towel or tissue to pinch your nostrils under the bony part of your nose. After 5 minutes, let go of your nose and see if bleeding starts again. Do not release pressure before that time. If there is still bleeding, repeat the pinching and holding for 5 minutes or until the bleeding stops.  Do not place tissues or gauze in the nose to stop the bleeding.  Avoid lying down and avoid tilting your head backward. That may make blood collect in the throat and cause gagging or coughing.  Use a nasal spray decongestant to help with a nosebleed as told by your health care provider.  After a nosebleed:  Avoid blowing your nose or sniffing for a number of hours.  Avoid straining, lifting, or bending at the waist for several days. You may go back to other normal activities as you are able.  If you are taking aspirin or blood thinners and you have nosebleeds, talk to your health care provider. These medicines make bleeding more likely.  Ask your health care provider if you should stop taking the medicines or if you should adjust the dose.  Do not stop taking medicines that your health care provider has recommended unless he or she tells you to stop taking them.  If your nosebleed was caused by dry mucous membranes, use over-the-counter saline nasal spray or gel and a  humidifier as told by your health care provider. This will keep the mucous membranes moist and allow them to heal. If you need to use one of these products:  Choose one that is water-soluble.  Use only as much as you need and use it only as often as needed.  Do not lie down right after you use it.  If you get nosebleeds often, talk with your health care provider about medical treatments. Options may include:  Nasal cautery. This treatment stops and prevents nosebleeds by using a chemical swab or electrical device to lightly burn tiny blood vessels inside the nose.  Nasal packing. A gauze or other material is placed in the nose to keep constant pressure on the bleeding area.  Contact a health care provider if you:  Have a fever.  Get nosebleeds often or more often than usual.  Bruise very easily.  Have a nosebleed from having something stuck in your nose.  Have bleeding in your mouth.  Vomit or cough up brown material.  Have a nosebleed after you start a new medicine.  Get help right away if:  You have a nosebleed after a fall or a head injury.  Your nosebleed does not go away after 20 minutes.  You feel dizzy or weak.  You have unusual bleeding from other parts of your body.  You have unusual bruising on other parts of your body.  You become sweaty.  You vomit blood.  Summary  A nosebleed is when blood comes out of the nose. Common causes include allergies, an injury to the nose, or cold or dry air.  Initial treatment includes applying pressure for 5 minutes.  Moisturizing the nose with saline nasal spray or gel after a nosebleed may help prevent future bleeding.  Get help right away if your nosebleed does not go away after 20 minutes.  This information is not intended to replace advice given to you by your health care provider. Make sure you discuss any questions you have with your health care provider.    Proper Instillation of Intranasal sprays:  Blow nose to clear nostrils.  Place the tip of the nozzle in one  nostril and close the other nostril with your finger.  Aim slightly away from the center of your nose, toward the corner of the eye, press the white nozzle, and sniff the mist in gently. Be careful not to spray into your eyes.  Exhale through the mouth.  Repeat process in other nostril.   -The recommended starting dose for Flonase in adults is 2 sprays in each nostril once a day.  -Your provider may have recommended use of nasal saline or nasal navage use to rinse your sinuses. If this is the case, it would be recommended use use saline prior to instillation of intranasal medications to rinse the sinus surface prior to the medication.  -Avoid use of nasal saline or nasal rinses within 30 minutes after intranasal medication use, as to allow the medication to fully absorb.

## 2025-06-17 NOTE — PROGRESS NOTES
AUDIOMETRIC EVALUATION      Name:  Bruce Mckeon  :  1995  Age:  30 y.o.  Date of Evaluation:  2025       History:  Mr. Mckeon is seen today for a hearing evaluation due to history of neurofibromatosis.    Audiologic Information:  Concerns for Hearing: No  PETs: None  Other otologic surgical history: No  Aural Pressure/Fullness: None  Otalgia: No  Otorrhea: None  Tinnitus: Periodic  Dizziness: No  Noise Exposure: Yes  Family history of hearing loss: No  Head trauma requiring hospital stay: No  Chemotherapy: None  Other significant history: Schizophrenia    EVALUATION:    See audiogram    RESULTS:    Otoscopic Evaluation:        NOTE: Testing completed after ears were examined by Angela Pfeiffer nurse practitioner    Tympanometry (226 Hz):  Right: Type A  Left: Type A    IMPRESSIONS:  Pure tone thresholds for the right ear indicates a mild high-frequency sensorineural hearing loss at 4K hertz.  Pure tone thresholds for the left ear indicates mild sensorineural loss at 4K and 8K hertz.  Patient and mother was counseled with regard to the findings.    RECOMMENDATIONS/PLAN:  Follow-up recommendations of the nurse practitioner  Annual audiogram  Consistent utilization of hearing protection devices in noisy environments.  Discussed results and recommendations with patient. Questions were addressed and the patient was encouraged to contact our department should concerns arise.          Malachi Lorenzo M.S, [facility]  Licensed Audiologist

## 2025-06-19 ENCOUNTER — PATIENT ROUNDING (BHMG ONLY) (OUTPATIENT)
Dept: OTOLARYNGOLOGY | Facility: CLINIC | Age: 30
End: 2025-06-19
Payer: MEDICARE

## 2025-06-19 LAB
FACT VIII ACT/NOR PPP: 104 % (ref 56–140)
PATH INTERP BLD-IMP: NORMAL
VWF AG ACT/NOR PPP IA: 80 % (ref 50–200)
VWF:RCO ACT/NOR PPP PL AGG: 63 % (ref 50–200)

## 2025-06-19 NOTE — PROGRESS NOTES
A rubberit message has been send to the patient for PATIENT ROUNDING with [facility].

## 2025-07-07 RX ORDER — ATOGEPANT 60 MG/1
60 TABLET ORAL DAILY
Qty: 30 TABLET | Refills: 11 | OUTPATIENT
Start: 2025-07-07

## 2025-07-16 RX ORDER — ATOGEPANT 60 MG/1
60 TABLET ORAL DAILY
Qty: 30 TABLET | Refills: 0 | Status: SHIPPED | OUTPATIENT
Start: 2025-07-16

## 2025-07-16 RX ORDER — ATOGEPANT 60 MG/1
60 TABLET ORAL DAILY
Qty: 30 TABLET | Refills: 11 | OUTPATIENT
Start: 2025-07-16

## 2025-07-24 ENCOUNTER — OFFICE VISIT (OUTPATIENT)
Dept: NEUROLOGY | Facility: CLINIC | Age: 30
End: 2025-07-24
Payer: MEDICARE

## 2025-07-24 VITALS
HEIGHT: 75 IN | SYSTOLIC BLOOD PRESSURE: 124 MMHG | WEIGHT: 313.6 LBS | HEART RATE: 91 BPM | DIASTOLIC BLOOD PRESSURE: 80 MMHG | BODY MASS INDEX: 38.99 KG/M2

## 2025-07-24 DIAGNOSIS — G43.719 INTRACTABLE CHRONIC MIGRAINE WITHOUT AURA AND WITHOUT STATUS MIGRAINOSUS: Primary | ICD-10-CM

## 2025-07-24 RX ORDER — TOPIRAMATE 100 MG/1
TABLET, FILM COATED ORAL EVERY 12 HOURS SCHEDULED
COMMUNITY

## 2025-07-24 RX ORDER — ALBUTEROL SULFATE 90 UG/1
INHALANT RESPIRATORY (INHALATION)
COMMUNITY

## 2025-07-24 RX ORDER — PALIPERIDONE 6 MG/1
TABLET, EXTENDED RELEASE ORAL
COMMUNITY

## 2025-07-24 RX ORDER — CLOTRIMAZOLE 1 %
CREAM (GRAM) TOPICAL EVERY 12 HOURS SCHEDULED
COMMUNITY

## 2025-07-24 RX ORDER — ATOGEPANT 60 MG/1
60 TABLET ORAL DAILY
Qty: 30 TABLET | Refills: 5 | Status: SHIPPED | OUTPATIENT
Start: 2025-07-24

## 2025-07-24 RX ORDER — IBUPROFEN 600 MG/1
TABLET, FILM COATED ORAL
COMMUNITY

## 2025-07-24 NOTE — PROGRESS NOTES
"Chief Complaint  Migraine    Subjective          Bruce Mckeon presents to [facility]  History of Present Illness    History of Present Illness  The patient presents for a follow-up regarding migraines. At the last visit on 12/2024, he was started on Qulipta preventatively and Nurtec as needed.    He reports that his headaches have remained consistent, with an average of five headache days per month. A recent severe headache episode nearly caused him to collapse, characterized by a sudden, intense pain that subsided after approximately ten minutes. For breakthrough headaches, he takes Nurtec, and if it does not alleviate the pain, he waits a few hours before taking a second dose. If the second dose is ineffective, he resorts to Tylenol. Nurtec typically alleviates his headaches.    Severe dizzy spells have been a concern, leading to a consultation with an ENT specialist who ordered several tests. His psychiatrist also reduced his prazosin dosage in an attempt to alleviate the dizziness. He reports consuming only 2 to 3 bottles of water daily and notes that his urine appears muddy and he urinates infrequently, about once a day.    INTERVAL: Since last visit, the patient has been experiencing severe dizzy spells and his psychiatrist reduced his prazosin dosage to address this issue.    MEDICATIONS  CURRENT MEDS:  Qulipta  Start Date: 12/2024  Nurtec As needed  Start Date: 12/2024  Oxcarbazepine  Zyprexa  Abilify  Prazosin  PREVIOUS MEDS:  Tylenol       Objective   Vital Signs:   /80 (BP Location: Right arm, Patient Position: Sitting, Cuff Size: Adult)   Pulse 91   Ht 190.5 cm (75\")   Wt (!) 142 kg (313 lb 9.6 oz)   BMI 39.20 kg/m²     Physical Exam  HENT:      Head: Normocephalic.   Pulmonary:      Effort: Pulmonary effort is normal.   Neurological:      Mental Status: He is alert and oriented to person, place, and time.      Sensory: Sensation is intact.      " Motor: Motor function is intact.      Coordination: Coordination is intact.      Deep Tendon Reflexes: Reflexes are normal and symmetric.        Neurological Exam  Mental Status  Alert. Oriented to person, place, and time.    Sensory  Normal sensation.    Reflexes  Deep tendon reflexes are 2+ and symmetric in all four extremities.    Coordination    Finger-to-nose, rapid alternating movements and heel-to-shin normal bilaterally without dysmetria.      Result Review :               Assessment and Plan    Diagnoses and all orders for this visit:    1. Intractable chronic migraine without aura and without status migrainosus (Primary)        Assessment & Plan  1. Migraine.  His migraines have shown significant improvement with the current treatment regimen. The sharp, sudden pain he experienced is likely due to nerve inflammation rather than a typical migraine. The primary goal at this point is to increase fluid intake to help manage the headaches and prevent dizziness, as dehydration is a common trigger for headaches. He was advised to limit Nurtec to one dose per 24-hour period. Refills for Qulipta and Nurtec were provided.    2. Dizziness.  The dizziness is likely exacerbated by dehydration, especially given the warm summer weather and his current medications, including oxcarbazepine, Zyprexa, and Abilify, which can be dehydrating. He was advised to monitor his urine color as an indicator of hydration status, aiming for clear urine. Increasing fluid intake was emphasized as crucial for managing dizziness. He was also encouraged to complete the ENT workup to rule out vestibular issues.    Follow-up  A follow-up visit is scheduled in 6 months.         Follow Up   No follow-ups on file.  Patient was given instructions and counseling regarding his condition or for health maintenance advice. Please see specific information pulled into the AVS if appropriate.       Patient or patient representative verbalized consent for  the use of Ambient Listening during the visit with  JONATHAN Oconnor for chart documentation. 7/24/2025  14:18 EDT

## 2025-08-05 ENCOUNTER — HOSPITAL ENCOUNTER (OUTPATIENT)
Dept: GENERAL RADIOLOGY | Facility: HOSPITAL | Age: 30
Discharge: HOME | End: 2025-08-05
Admitting: NURSE PRACTITIONER
Payer: MEDICARE

## 2025-08-05 ENCOUNTER — TRANSCRIBE ORDERS (OUTPATIENT)
Dept: GENERAL RADIOLOGY | Facility: HOSPITAL | Age: 30
End: 2025-08-05
Payer: MEDICARE

## 2025-08-05 DIAGNOSIS — M53.3 DISORDER OF SACRUM: Primary | ICD-10-CM

## 2025-08-05 DIAGNOSIS — M53.3 DISORDER OF SACRUM: ICD-10-CM

## 2025-08-05 PROCEDURE — 72220 X-RAY EXAM SACRUM TAILBONE: CPT

## 2025-08-19 ENCOUNTER — TRANSCRIBE ORDERS (OUTPATIENT)
Dept: ADMINISTRATIVE | Facility: HOSPITAL | Age: 30
End: 2025-08-19
Payer: MEDICARE

## 2025-08-19 ENCOUNTER — OFFICE VISIT (OUTPATIENT)
Dept: OTOLARYNGOLOGY | Facility: CLINIC | Age: 30
End: 2025-08-19
Payer: MEDICARE

## 2025-08-19 VITALS
DIASTOLIC BLOOD PRESSURE: 78 MMHG | SYSTOLIC BLOOD PRESSURE: 116 MMHG | BODY MASS INDEX: 39.17 KG/M2 | TEMPERATURE: 97.2 F | HEIGHT: 75 IN | HEART RATE: 86 BPM | WEIGHT: 315 LBS

## 2025-08-19 DIAGNOSIS — J30.1 SEASONAL ALLERGIC RHINITIS DUE TO POLLEN: ICD-10-CM

## 2025-08-19 DIAGNOSIS — Q85.00: ICD-10-CM

## 2025-08-19 DIAGNOSIS — S32.2XXK: Primary | ICD-10-CM

## 2025-08-19 DIAGNOSIS — R04.0 RECURRENT EPISTAXIS: ICD-10-CM

## 2025-08-19 DIAGNOSIS — H90.3 SENSORINEURAL HEARING LOSS, BILATERAL: ICD-10-CM

## 2025-08-19 DIAGNOSIS — R42 DIZZINESS: Primary | ICD-10-CM

## 2025-08-19 DIAGNOSIS — G43.719 INTRACTABLE CHRONIC MIGRAINE WITHOUT AURA AND WITHOUT STATUS MIGRAINOSUS: ICD-10-CM

## 2025-08-19 PROCEDURE — 1160F RVW MEDS BY RX/DR IN RCRD: CPT

## 2025-08-19 PROCEDURE — 99214 OFFICE O/P EST MOD 30 MIN: CPT

## 2025-08-19 PROCEDURE — 1159F MED LIST DOCD IN RCRD: CPT

## 2025-08-19 RX ORDER — MONTELUKAST SODIUM 10 MG/1
10 TABLET ORAL DAILY
Qty: 30 TABLET | Refills: 3 | Status: SHIPPED | OUTPATIENT
Start: 2025-08-19

## 2025-08-19 RX ORDER — AZELASTINE 1 MG/ML
2 SPRAY, METERED NASAL 2 TIMES DAILY
Qty: 30 ML | Refills: 11 | Status: SHIPPED | OUTPATIENT
Start: 2025-08-19